# Patient Record
Sex: FEMALE | Race: WHITE | Employment: FULL TIME | URBAN - METROPOLITAN AREA
[De-identification: names, ages, dates, MRNs, and addresses within clinical notes are randomized per-mention and may not be internally consistent; named-entity substitution may affect disease eponyms.]

---

## 2019-12-13 ENCOUNTER — HOSPITAL ENCOUNTER (EMERGENCY)
Age: 44
Discharge: HOME | End: 2019-12-13
Attending: EMERGENCY MEDICINE
Payer: COMMERCIAL

## 2019-12-13 ENCOUNTER — APPOINTMENT (OUTPATIENT)
Dept: GENERAL RADIOLOGY | Age: 44
End: 2019-12-13
Payer: COMMERCIAL

## 2019-12-13 VITALS
HEIGHT: 69 IN | HEART RATE: 84 BPM | BODY MASS INDEX: 31.1 KG/M2 | SYSTOLIC BLOOD PRESSURE: 127 MMHG | TEMPERATURE: 97.8 F | WEIGHT: 210 LBS | DIASTOLIC BLOOD PRESSURE: 75 MMHG | RESPIRATION RATE: 18 BRPM | OXYGEN SATURATION: 99 %

## 2019-12-13 DIAGNOSIS — M54.10 RADICULOPATHY, UNSPECIFIED SPINAL REGION: Primary | ICD-10-CM

## 2019-12-13 PROCEDURE — 99283 EMERGENCY DEPT VISIT LOW MDM: CPT

## 2019-12-13 PROCEDURE — 72040 X-RAY EXAM NECK SPINE 2-3 VW: CPT

## 2019-12-13 PROCEDURE — 73080 X-RAY EXAM OF ELBOW: CPT

## 2019-12-13 PROCEDURE — 73030 X-RAY EXAM OF SHOULDER: CPT

## 2019-12-13 RX ORDER — ROPINIROLE 0.25 MG/1
0.25 TABLET, FILM COATED ORAL 2 TIMES DAILY
COMMUNITY

## 2019-12-13 RX ORDER — METHOCARBAMOL 750 MG/1
750 TABLET, FILM COATED ORAL 4 TIMES DAILY
Qty: 40 TABLET | Refills: 0 | Status: SHIPPED | OUTPATIENT
Start: 2019-12-13 | End: 2019-12-23

## 2019-12-13 RX ORDER — NAPROXEN 500 MG/1
500 TABLET ORAL 2 TIMES DAILY WITH MEALS
Qty: 20 TABLET | Refills: 0 | Status: SHIPPED | OUTPATIENT
Start: 2019-12-13

## 2019-12-13 ASSESSMENT — PAIN DESCRIPTION - ORIENTATION: ORIENTATION: LEFT

## 2019-12-13 ASSESSMENT — PAIN DESCRIPTION - FREQUENCY: FREQUENCY: INTERMITTENT

## 2019-12-13 ASSESSMENT — PAIN SCALES - GENERAL: PAINLEVEL_OUTOF10: 4

## 2019-12-13 ASSESSMENT — PAIN DESCRIPTION - ONSET: ONSET: ON-GOING

## 2019-12-13 ASSESSMENT — PAIN DESCRIPTION - DESCRIPTORS: DESCRIPTORS: RADIATING;ACHING;SHARP

## 2019-12-13 ASSESSMENT — PAIN DESCRIPTION - PAIN TYPE: TYPE: ACUTE PAIN

## 2019-12-13 ASSESSMENT — PAIN DESCRIPTION - LOCATION: LOCATION: ARM

## 2020-02-17 ENCOUNTER — HOSPITAL ENCOUNTER (OUTPATIENT)
Dept: PHYSICAL THERAPY | Facility: CLINIC | Age: 45
Setting detail: THERAPIES SERIES
Discharge: HOME | End: 2020-02-17
Payer: COMMERCIAL

## 2020-02-17 PROCEDURE — 97012 MECHANICAL TRACTION THERAPY: CPT

## 2020-02-17 PROCEDURE — 97161 PT EVAL LOW COMPLEX 20 MIN: CPT

## 2020-02-17 PROCEDURE — 97110 THERAPEUTIC EXERCISES: CPT

## 2020-02-17 NOTE — PLAN OF CARE
[] Griselda Shearing        Outpatient Physical                Therapy       [address]       Phone: [phone]       Fax: [phone] [x] [facility]       Promotion at [address] [facility]       Phone: [phone]       Fax: [phone] [] [facility]    [address]     Phone: [phone]     Fax:  [phone]     Physical Therapy Plan of Care    Date:  2020  Patient: Fabricio James  : 1975  MRN: [id number]   Physician: Alvaro Vilchis        Insurance: Daniella Herman ([id number])  Medical Diagnosis: M54.12 - Acute Cervical Radiculopathy                         Rehab Codes: M54.2, M54.6, M79.1, R29.3, D08.48, G10, R20.2  Onset Date: 2/3/20 (rx date)             Next 's appt.: unknown     Subjective:   CC: Pt c/o intermittent neck pain with radicular sxs down L UE, into entire hand. This is not the pt's dominant arm. Pt describes sensations into L UE as, sharp, stabbing, shooting & aching. She notes difficulty with concentration & has occasional HAs. Pt reports difficulty with postural awareness, driving (steering), & sleeping (trouble with falling asleep).        HPI: Pt reports insidious onset of neck pain that began in  and she never really addressed her pain until recently. Pt denies previous neck issues & has not sought PT &/or chiropractic care for current issue. The pt was seen at [facility] ED d/t current condition. She had imaging, which was negative. Pt was D/C'd with NSAIDs & tramadol. Pt followed up with PCP & was given steroid burst pack with moderate relief, but once she finished dosing, the pain returned.  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  Pt elaborates she has difficulty getting to sleep. She is a L side sleeper and her current issues are on her L side. Pt denies any in 7 treatments)  1. ? Pain: To <5/10 with activity to increase ease with ADLs  2. ? ROM: Cervical spine (all planes) by 5-10 degrees to improve daily function. 3. ? Strength: LUE musculature throughout by 1/2 to a full grade to improve posture & lifting endurance. 4. Patient to be able to achieve 14 sec on the deep neck flexor endurance test, without recruitment of SCM, to improve cervical stability.   5. Independent with Home Exercise Programs   6. ? Sleep Function: Pt to report falling asleep in <30min & getting 7 hours of consistent sleep to improve quality of life.      LTG: (to be met in 14 treatments)  1. ? Pain: To <1-2/10 with activity to increase ease with ADLs. 2. ? ROM: Cervical (all planes) to [facility] improve daily function. 3. ? Strength: LUE musculature throughout to 4+/5 to improve posture & lifting endurance.   4. ? Function:    · Improve NDI Score to <10% impairment to demonstrate improved activity tolerance.   · Patient to be able to achieve 28 sec on the deep neck flexor endurance test, without recruitment of SCM, to improve cervical stability. · Pt to demo increased L  strength to 55lbs or greater to improve gripping function.      Patient goals: \"to get rid of pain. \"     Treatment Plan:  [x]? Therapeutic Exercise   88497             []? Iontophoresis: 4 mg/mL Dexamethasone Sodium Phosphate  mAmin  62443   [x]? Therapeutic Activity  32916 [x]? Vasopneumatic cold with compression  [id number]               []? Gait Training    80252 []? Ultrasound        [id number]   []? Neuromuscular Re-education  [id number] [x]? Electrical Stimulation Unattended  [id number]   [x]? Manual Therapy  30723 [x]? Electrical Stimulation Attended  [id number]   [x]? Instruction in HEP       [x]? Lumbar/Cervical Traction  [id number]   []? Aquatic Therapy           [id number] [x]? Cold/hotpack     []? Massage   60832      []? Dry Needling, 1 or 2 muscles  97381   []? Biofeedback, first 15 minutes   02162  []?  Biofeedback, additional 15 minutes   [id number] [x]? Dry Needling, 3 or more muscles  40974     Frequency:  1-2 x/week for 14 visits    Electronically signed by: Simi Landa PT     Physician Signature:________________________________Date:__________________  By signing above or cosigning this note, I have reviewed this plan of care and certify a need for medically necessary rehabilitation services.      *PLEASE SIGN ABOVE AND FAX BACK ALL PAGES*

## 2020-02-17 NOTE — CONSULTS
sleeper and her current issues are on her L side. Pt denies any red flags as related to present condition.     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     Pain:  [x] Yes  [] No Location: neck into L UE Pain Rating: (0-10 scale) 10/10 at worst  Pain altered Tx:  [] Yes  [x] No  Action:    Symptoms:  [x] Improving [] Worsening [] Same    Better:  [x] AM    [] PM    [] Sit    [] Rise/Sit    []Stand    [] Walk    [] Lying    [] Other:  Worse: [] AM    [x] PM    [] Sit    [] Rise/Sit    []Stand    [] Walk    [] Lying    [] Bend                      [] Valsalva    [] Other:    Sleep: [] OK    [x] Disturbed    Red Flags:  Cervical Myelopathy Normal (-)/Abnormal (+)   Sensory disturbances in hand -   Wasting of hand muscles -   Unsteady gait -   Dillon's reflex -   Hyper-reflexia -   Bladder and bowel problems- incontinence or constipation of urine or fecal matter -   Multi-segmental weakness and/or sensory disturbances -      Neoplastic Conditions Normal (-)/Abnormal (+)   Age > 46 y.o. -   Previous history of cancer -   Unexplained weight loss- >10lb in 1 week -   Constant pain -   No relief of pain with bed rest -   Night pain- not relieved with change in body position -      Upper Cervical Ligamentous Instabilities Normal (-)/Abnormal (+)   Occipital headache and numbness -   Severe limitation during neck ROM in all directions -   Signs of cervical myelopathy -    Vertebral Artery Insufficiencies- VBI Normal (-)/Abnormal (+)   Drop attack -   Dizziness/light headedness related to neck movement -   Dysphasia -   Dysarthria -   Diplopia -   Positive cranial nerve signs -      Objective:     STRENGTH  RUE WNLs  LUE 4-/5 grossly thoughout * = pain ROM     Left Right Cervical     C5 Shld Abd   Flexion 40deg   Shld Flexion   Extension 20deg   Shld IR   Rotation L 50deg R 55deg   Shld ER   Sidebend L 20deg R 20deg   C6 Elb Flex         C7 Elb Ext       C8 EPL       T1 Fing Abd        40# 60#       DTR           Bicep 0 1+ AROM       Brachioradialis 0 1+ BUE WNLs          TESTS (+/-) LEFT RIGHT Not Tested   Cerv. Comp + + []? ?    Cerv. Distraction + relief + relief []? ?    Cerv. Alar/Transverse - - []? ?    Sharp-Bret - -     ULTT (median/ulnar/radial) + -    Tinnel's Sign (elbow)  + -    Shoulder abduction relief +relief -    Vertebral Artery - - []? ?    Mayo Tests ? Pain ? Pain No Change Not Tested   Retraction []? ?  [x]? ?  []??  []??       OBSERVATION No Deficit Deficit Not Tested Comments   Posture           Forward Head []? ?  [x]? ?  []??      Rounded Shoulders []? ?  [x]? ?  []??      Slumped Sitting []? ?  [x]? ?  []??      Palpation []? ?  [x]? ?  []??  TTP cervical & upper thoracic paraspinals   Sensation [x]? ?  []??  []??      Edema [x]? ?  []??  []??      Neurological [x]? ?  []??  []??         FUNCTION Normal Difficult Unable   Overhead reach  []? ?  [x]? ?  []??    Underarm reach  []? ?  [x]? ?  []??    Groom/Dress []? ?  [x]? ?  []??    Bra/Shirt tuck []? ?  [x]? ?  []??    Lift/Carry []? ?  [x]? ?  []??       FUNCTION Normal Difficult Unable   Sitting [x]? ?  []??  []??    Standing [x]? ?  []??  []??    Ambulation [x]? ?  []??  []??    Groom/Dress []? ?  [x]? ?  []??    Lift/Carry []? ?  [x]? ?  []??    Stairs [x]? ?  []??  []??    Bending [x]? ?  []??  []??    OH reach []? ?  [x]? ?  []??    Sit to Stand [x]? ?  []??  []??       Assessment: Pt is a 45 y/o pleasant female who presents to Physical Therapy with s/s consistent with cervical radiculopathy, with sxs down LUE into hand. The pt has had negative cervical, L shoulder & L elbow radiographs. She has not yet had a cervical MRI. Pt notes increased difficulty with overall function and ADLs, d/t LUE pain & especially on higher pain days. Pt was able to get some relief from intermittent, cervical mechanical traction with notable centralization phenomena. Supervising PT would like to also implement dry needling, to further target somatic & neurologic dysfunction of the cervical spine. Pt would benefit from physical therapy to promote improved postural awareness and strengthening to reduce symptomology & improve spinal stabilization. Problems at initial evaluation 2/17/20:    [x]? ? ? Neck Pain: 10/10 at worst  [x]? ? ? Cervical ROM: all planes  [x]? ? ? Strength: throughout LUE grossly throughout & L  strength. [x]? ? ? Function: NDI Score =9/50 =18% deficit  [x]? ? Postural Deviations B Upper Crossed Syndrome; Forward Head Posture  [x]? ? Other: (+) TTP at B cervical/upper thoracic paraspinals; (+) Cervical Spine Testing: (+) Compression, L UTT (median, ulnar, radial), Distraction (relief), Cervical joint mobility restrictions throughout, especially lower cervical, Unable to maintain deep neck flexor endurance test position; unable to elicit DTRs in LUE; reduced DTRs 1+ in RUE; (+) L shoulder abduction test (+) L Elbow Tinnel's sign (ulnar).                STG: (to be met in 7 treatments)  1. ? Pain: To <5/10 with activity to increase ease with ADLs  2. ? ROM: Cervical spine (all planes) by 5-10 degrees to improve daily function. 3. ? Strength: LUE musculature throughout by 1/2 to a full grade to improve posture & lifting endurance.    4. Patient to be able to achieve 14 sec on the deep neck flexor endurance test, without recruitment of SCM, to improve cervical stability.   5. Independent with Home Exercise Programs   6. ? Sleep Function: Pt 83986      []? Dry Needling, 1 or 2 muscles  36401   []? Biofeedback, first 15 minutes   93576  []? Biofeedback, additional 15 minutes   99468 [x]?  Dry Needling, 3 or more muscles  20561     Frequency:  1-2 x/week for 14 visits     Todays Treatment:  Modalities:trialed cervical mechanical traction for 10min 60/20 on/off at 20lbs (max) & 10lbs (min) with 30 deg incline; Supine with knees on wedge; L shoulder supported by pillow  Precautions:   Exercises: HEP Review - [email or web address] access code  Exercise Reps/ Time Weight/ Level Comments   UBE         Pulleys                   Standing         Pect stretching next   corner   Cerv Retraction                   Seated         UT stretching  HEP       Lev Scap Stretching HEP       SCM Stretching HEP       Seated Scap Retraction HEP       Cerv Traction HEP   With towel             Supine          Pect stretch  next   Half roll vertically along spine   Deep Neck Flexor retraining (head nod yes) HEP  3x5x5\"   Add next: slight nod yes, pumped up to >20mmHg at cervical lordosis in supine, allow pt to hold dial so he can move needle 2-3 increments slowly     HEP is head nod with towel roll   Cerv Retraction HEP   Into pillow   SOR with tennis balls HEP                 Prone         Prone scapular program ITWY next                 Tband                   Wall Push Ups next       Retro wall push ups next          Other: education about HEP, neck radic/centralization phenomena, postural strengthening/correction, lifting tech, sleep positions, forward head posture/reduction, household activities, & office posture, ice/heat, DN, lifting, tx of persistent pain with mindfulness & exercise.      Specific Instructions for next treatment: emphasis on posture, centralization & HEP compliance, DN, MFR, cervical SOR & pectoral stretching/MFR, MFR to longus collli/capitus/SCM/scalenes, manual cerv. distraction; B periscapular/postural strength ex, pectoral/cervical paraspinal flexibility, progress as tolerated, cupping, modalities prn     Evaluation Complexity:  History (Personal factors, comorbidities) [] 0 [x] 1-2 [] 3+   Exam (limitations, restrictions) [] 1-2 [] 3 [x] 4+   Clinical presentation (progression) [x] Stable [] Evolving  [] Unstable   Decision Making [x] Low [] Moderate [] High    [x] Low Complexity [] Moderate Complexity [] High Complexity       Treatment Charges: Mins Units   [x] Evaluation       [x]  Low       []  Moderate       []  High 40 1   []  Modalities     [x]  Ther Exercise 10 1   []  Manual Therapy     []  Ther Activities     []  Aquatics     []  Vasocompression     [x]  Other Traction 10 1     TOTAL TREATMENT TIME: 60min    Time in: 5p      Time out: 6p    Electronically signed by: Cooper Smart PT      Physician Signature:________________________________Date:__________________  By signing above or cosigning this note, I have reviewed this plan of care and certify a need for medically necessary rehabilitation services.      *PLEASE SIGN ABOVE AND FAX BACK ALL PAGES*

## 2020-02-27 ENCOUNTER — APPOINTMENT (OUTPATIENT)
Dept: PHYSICAL THERAPY | Facility: CLINIC | Age: 45
End: 2020-02-27
Payer: COMMERCIAL